# Patient Record
Sex: MALE
[De-identification: names, ages, dates, MRNs, and addresses within clinical notes are randomized per-mention and may not be internally consistent; named-entity substitution may affect disease eponyms.]

---

## 2020-06-29 PROBLEM — Z00.00 ENCOUNTER FOR PREVENTIVE HEALTH EXAMINATION: Status: ACTIVE | Noted: 2020-06-29

## 2020-06-30 ENCOUNTER — APPOINTMENT (OUTPATIENT)
Dept: UROLOGY | Facility: CLINIC | Age: 60
End: 2020-06-30
Payer: COMMERCIAL

## 2020-06-30 VITALS
TEMPERATURE: 98 F | BODY MASS INDEX: 25.06 KG/M2 | DIASTOLIC BLOOD PRESSURE: 88 MMHG | WEIGHT: 185 LBS | HEART RATE: 65 BPM | SYSTOLIC BLOOD PRESSURE: 129 MMHG | HEIGHT: 72 IN

## 2020-06-30 DIAGNOSIS — N41.9 INFLAMMATORY DISEASE OF PROSTATE, UNSPECIFIED: ICD-10-CM

## 2020-06-30 DIAGNOSIS — R97.20 ELEVATED PROSTATE, SPECIFIC ANTIGEN [PSA]: ICD-10-CM

## 2020-06-30 PROCEDURE — 99204 OFFICE O/P NEW MOD 45 MIN: CPT

## 2020-06-30 NOTE — PHYSICAL EXAM
[General Appearance - Well Developed] : well developed [General Appearance - Well Nourished] : well nourished [] : no respiratory distress [Heart Rate And Rhythm] : Heart rate and rhythm were normal [Bowel Sounds] : normal bowel sounds [Respiration, Rhythm And Depth] : normal respiratory rhythm and effort [Penis Abnormality] : normal circumcised penis [Abdomen Soft] : soft [Prostate Enlargement] : the prostate was not enlarged [Testes Tenderness] : no tenderness of the testes [Skin Color & Pigmentation] : normal skin color and pigmentation [Normal Station and Gait] : the gait and station were normal for the patient's age [Skin Turgor] : supple [No Focal Deficits] : no focal deficits [Not Anxious] : not anxious [No Palpable Adenopathy] : no palpable adenopathy [Oriented To Time, Place, And Person] : oriented to person, place, and time [FreeTextEntry1] : BELLE, mild R enlargement, 40 g;

## 2020-06-30 NOTE — ASSESSMENT
[FreeTextEntry1] : 60 yo M with mildly elevated PSA to 5.6 (drawn twice) by PCP here today with symptoms of prostatitis. One history of STI. \par \par - Urine culture today, PSA redraw today as well\par - Discussed acute vs chronic prostatitis; as well as effects on PSA. If continues to have symptoms, may require cystoscopy to evaluate for urethral stricture\par

## 2020-06-30 NOTE — HISTORY OF PRESENT ILLNESS
[Urinary Urgency] : urinary urgency [Urinary Frequency] : urinary frequency [Straining] : straining [FreeTextEntry1] : Mr. Kerr is a 60 yo M with history of burning with urination, no hematuria. Most recent PSA 5.6 (PCP, 3 weeks ago), redrawn (similar to first test). Has a slower stream, good control. Occasional intermittency. Good emptying, rarely with some sensation of incomplete emptying. Post ejaculatory urination has some burning. One episode of STI, has some pushing as well. \par \par PVR today 83 cc. \par \par PMHX: ?liver dysfunction, no cardiac issues, proteinuria\par PSHx: umbilical hernia repair\par FHx: grandfather ?CaP +partial removal of prostatectomy -  at 104, father + CaP diagnosed in 80s, cousin + CaP, uncle + lung cancer, son with history of testicular cancer\par Soc: rec , , 2 children [Urinary Retention] : no urinary retention [Nocturia] : no nocturia [Urinary Incontinence] : no urinary incontinence

## 2020-07-01 LAB
ANION GAP SERPL CALC-SCNC: 14 MMOL/L
BUN SERPL-MCNC: 23 MG/DL
CALCIUM SERPL-MCNC: 9.9 MG/DL
CHLORIDE SERPL-SCNC: 102 MMOL/L
CO2 SERPL-SCNC: 26 MMOL/L
CREAT SERPL-MCNC: 1.07 MG/DL
GLUCOSE SERPL-MCNC: 90 MG/DL
POTASSIUM SERPL-SCNC: 4.9 MMOL/L
PSA SERPL-MCNC: 6.39 NG/ML
SODIUM SERPL-SCNC: 142 MMOL/L

## 2020-07-02 LAB — BACTERIA UR CULT: NORMAL

## 2020-07-14 ENCOUNTER — TRANSCRIPTION ENCOUNTER (OUTPATIENT)
Age: 60
End: 2020-07-14

## 2020-07-16 RX ORDER — ENEMA 19; 7 G/133ML; G/133ML
7-19 ENEMA RECTAL
Qty: 1 | Refills: 0 | Status: ACTIVE | COMMUNITY
Start: 2020-07-16 | End: 1900-01-01

## 2020-09-25 RX ORDER — TAMSULOSIN HYDROCHLORIDE 0.4 MG/1
0.4 CAPSULE ORAL
Qty: 30 | Refills: 3 | Status: ACTIVE | COMMUNITY
Start: 2020-07-30 | End: 1900-01-01